# Patient Record
Sex: MALE | Race: BLACK OR AFRICAN AMERICAN | NOT HISPANIC OR LATINO | Employment: UNEMPLOYED | URBAN - METROPOLITAN AREA
[De-identification: names, ages, dates, MRNs, and addresses within clinical notes are randomized per-mention and may not be internally consistent; named-entity substitution may affect disease eponyms.]

---

## 2020-08-13 LAB — CRC RECOMMENDATION EXT: NORMAL

## 2024-05-03 ENCOUNTER — LAB VISIT (OUTPATIENT)
Dept: LAB | Facility: CLINIC | Age: 64
End: 2024-05-03
Payer: MEDICARE

## 2024-05-03 ENCOUNTER — OFFICE VISIT (OUTPATIENT)
Dept: FAMILY MEDICINE | Facility: CLINIC | Age: 64
End: 2024-05-03
Payer: MEDICARE

## 2024-05-03 VITALS
OXYGEN SATURATION: 95 % | DIASTOLIC BLOOD PRESSURE: 70 MMHG | HEART RATE: 66 BPM | HEIGHT: 64 IN | RESPIRATION RATE: 18 BRPM | WEIGHT: 154 LBS | BODY MASS INDEX: 26.29 KG/M2 | SYSTOLIC BLOOD PRESSURE: 120 MMHG

## 2024-05-03 DIAGNOSIS — E78.2 MIXED HYPERLIPIDEMIA: ICD-10-CM

## 2024-05-03 DIAGNOSIS — I48.11 LONGSTANDING PERSISTENT ATRIAL FIBRILLATION: ICD-10-CM

## 2024-05-03 DIAGNOSIS — R79.9 ABNORMAL FINDING OF BLOOD CHEMISTRY, UNSPECIFIED: ICD-10-CM

## 2024-05-03 DIAGNOSIS — I48.11 LONGSTANDING PERSISTENT ATRIAL FIBRILLATION: Primary | ICD-10-CM

## 2024-05-03 DIAGNOSIS — I10 PRIMARY HYPERTENSION: ICD-10-CM

## 2024-05-03 PROBLEM — I48.91 ATRIAL FIBRILLATION: Status: ACTIVE | Noted: 2024-05-03

## 2024-05-03 LAB
ALBUMIN SERPL BCP-MCNC: 4 G/DL (ref 3.5–5.2)
ALP SERPL-CCNC: 81 U/L (ref 55–135)
ALT SERPL W/O P-5'-P-CCNC: 36 U/L (ref 10–44)
ANION GAP SERPL CALC-SCNC: 7 MMOL/L (ref 8–16)
AST SERPL-CCNC: 23 U/L (ref 10–40)
BASOPHILS # BLD AUTO: 0.03 K/UL (ref 0–0.2)
BASOPHILS NFR BLD: 0.4 % (ref 0–1.9)
BILIRUB SERPL-MCNC: 0.4 MG/DL (ref 0.1–1)
BUN SERPL-MCNC: 18 MG/DL (ref 8–23)
CALCIUM SERPL-MCNC: 9.8 MG/DL (ref 8.7–10.5)
CHLORIDE SERPL-SCNC: 105 MMOL/L (ref 95–110)
CHOLEST SERPL-MCNC: 157 MG/DL (ref 120–199)
CHOLEST/HDLC SERPL: 3.3 {RATIO} (ref 2–5)
CO2 SERPL-SCNC: 26 MMOL/L (ref 23–29)
CREAT SERPL-MCNC: 1 MG/DL (ref 0.5–1.4)
DIFFERENTIAL METHOD BLD: ABNORMAL
EOSINOPHIL # BLD AUTO: 0.4 K/UL (ref 0–0.5)
EOSINOPHIL NFR BLD: 4.8 % (ref 0–8)
ERYTHROCYTE [DISTWIDTH] IN BLOOD BY AUTOMATED COUNT: 12.8 % (ref 11.5–14.5)
EST. GFR  (NO RACE VARIABLE): >60 ML/MIN/1.73 M^2
ESTIMATED AVG GLUCOSE: 120 MG/DL (ref 68–131)
GLUCOSE SERPL-MCNC: 105 MG/DL (ref 70–110)
HBA1C MFR BLD: 5.8 % (ref 4–5.6)
HCT VFR BLD AUTO: 43 % (ref 40–54)
HCV AB SERPL QL IA: NORMAL
HDLC SERPL-MCNC: 47 MG/DL (ref 40–75)
HDLC SERPL: 29.9 % (ref 20–50)
HGB BLD-MCNC: 14.4 G/DL (ref 14–18)
IMM GRANULOCYTES # BLD AUTO: 0.02 K/UL (ref 0–0.04)
IMM GRANULOCYTES NFR BLD AUTO: 0.3 % (ref 0–0.5)
LDLC SERPL CALC-MCNC: 91 MG/DL (ref 63–159)
LYMPHOCYTES # BLD AUTO: 1.9 K/UL (ref 1–4.8)
LYMPHOCYTES NFR BLD: 25.2 % (ref 18–48)
MCH RBC QN AUTO: 30.3 PG (ref 27–31)
MCHC RBC AUTO-ENTMCNC: 33.5 G/DL (ref 32–36)
MCV RBC AUTO: 91 FL (ref 82–98)
MONOCYTES # BLD AUTO: 0.7 K/UL (ref 0.3–1)
MONOCYTES NFR BLD: 9.5 % (ref 4–15)
NEUTROPHILS # BLD AUTO: 4.5 K/UL (ref 1.8–7.7)
NEUTROPHILS NFR BLD: 59.8 % (ref 38–73)
NONHDLC SERPL-MCNC: 110 MG/DL
NRBC BLD-RTO: 0 /100 WBC
PLATELET # BLD AUTO: 330 K/UL (ref 150–450)
PMV BLD AUTO: 9 FL (ref 9.2–12.9)
POTASSIUM SERPL-SCNC: 4.4 MMOL/L (ref 3.5–5.1)
PROT SERPL-MCNC: 7.4 G/DL (ref 6–8.4)
RBC # BLD AUTO: 4.75 M/UL (ref 4.6–6.2)
SODIUM SERPL-SCNC: 138 MMOL/L (ref 136–145)
TRIGL SERPL-MCNC: 95 MG/DL (ref 30–150)
TSH SERPL DL<=0.005 MIU/L-ACNC: 1.32 UIU/ML (ref 0.4–4)
WBC # BLD AUTO: 7.57 K/UL (ref 3.9–12.7)

## 2024-05-03 PROCEDURE — 84443 ASSAY THYROID STIM HORMONE: CPT | Performed by: FAMILY MEDICINE

## 2024-05-03 PROCEDURE — 86803 HEPATITIS C AB TEST: CPT | Performed by: FAMILY MEDICINE

## 2024-05-03 PROCEDURE — 85025 COMPLETE CBC W/AUTO DIFF WBC: CPT | Performed by: FAMILY MEDICINE

## 2024-05-03 PROCEDURE — 80061 LIPID PANEL: CPT | Performed by: FAMILY MEDICINE

## 2024-05-03 PROCEDURE — 99204 OFFICE O/P NEW MOD 45 MIN: CPT | Mod: S$GLB,,, | Performed by: FAMILY MEDICINE

## 2024-05-03 PROCEDURE — 36415 COLL VENOUS BLD VENIPUNCTURE: CPT | Mod: ,,, | Performed by: FAMILY MEDICINE

## 2024-05-03 PROCEDURE — 87389 HIV-1 AG W/HIV-1&-2 AB AG IA: CPT | Performed by: FAMILY MEDICINE

## 2024-05-03 PROCEDURE — G2211 COMPLEX E/M VISIT ADD ON: HCPCS | Mod: S$GLB,,, | Performed by: FAMILY MEDICINE

## 2024-05-03 PROCEDURE — 83036 HEMOGLOBIN GLYCOSYLATED A1C: CPT | Performed by: FAMILY MEDICINE

## 2024-05-03 PROCEDURE — 80053 COMPREHEN METABOLIC PANEL: CPT | Performed by: FAMILY MEDICINE

## 2024-05-03 RX ORDER — ROSUVASTATIN CALCIUM 10 MG/1
10 TABLET, COATED ORAL DAILY
COMMUNITY
Start: 2010-04-26

## 2024-05-03 RX ORDER — AMLODIPINE BESYLATE 5 MG/1
5 TABLET ORAL DAILY
COMMUNITY

## 2024-05-03 RX ORDER — LISINOPRIL AND HYDROCHLOROTHIAZIDE 20; 25 MG/1; MG/1
1 TABLET ORAL DAILY
COMMUNITY

## 2024-05-03 RX ORDER — PROPAFENONE HYDROCHLORIDE 225 MG/1
225 CAPSULE, EXTENDED RELEASE ORAL EVERY 12 HOURS
COMMUNITY
Start: 2022-10-01

## 2024-05-03 RX ORDER — APIXABAN 5 MG/1
5 TABLET, FILM COATED ORAL 2 TIMES DAILY
COMMUNITY
Start: 2022-03-03

## 2024-05-03 NOTE — PATIENT INSTRUCTIONS
Doing very well today  Get blood work today  Will send to cardiologist    Can start nail medication if labs look good    Follow up 6 months

## 2024-05-03 NOTE — PROGRESS NOTES
"    [facility] - [place], [place]    Family Medicine Office Visit    Chief Complaint   Patient presents with    [facility] Care     New patient         HPI:  63 male new patient.    Afib stable s/p ablation.  Now on propafenone and eliquis  Blood Pressure at goal on medication, with patient reporting prescription compliance  Hyperlipidemia stable on appropriate intensity statin therapy    Needs no refills today      ROS: as above    Vitals:    05/03/24 0916   BP: 120/70   BP Location: Left arm   Patient Position: Sitting   BP Method: Medium (Manual)   Pulse: 66   Resp: 18   SpO2: 95%   Weight: 69.9 kg (154 lb)   Height: 5' 4" (1.626 m)      Body mass index is 26.43 kg/m².      General:  AOx3, well nourished and developed in no acute distress  Eyes:  PERRLA, EOMI, vision intact grossly  ENT:  normal hearing, moist oral mucosa  Neck:  trachea midline with no masses or thyromegaly  Heart:  RRR, no murmurs.  No edema noted, extremities warm and well perfused  Lungs:  clear to auscultation bilaterally with symmetric chest movement  Abdomen:  Soft, nontender, nondistended.  Normal bowel sounds  Musculoskeletal:  Normal gait.  Normal posture.  Normal muscular development with no joint swelling.  Neurological:  CN II-XII grossly intact. Symmetric strength and sensation  Psych:  Normal mood and affect.  Able to demonstrate good judgement and personal insight.      Assessment/Plan:    1. Longstanding persistent atrial fibrillation  -     TSH; Future; Expected date: 05/03/2024    2. Primary hypertension  -     Hepatitis C antibody; Future; Expected date: 05/03/2024  -     Lipid panel; Future; Expected date: 05/03/2024  -     HIV 1/2 Ag/Ab (4th Gen); Future; Expected date: 05/03/2024  -     CBC Auto Differential; Future; Expected date: 05/03/2024  -     Comprehensive Metabolic Panel; Future; Expected date: 05/03/2024  -     Hemoglobin A1C; Future; Expected date: 05/03/2024  -     TSH; Future; " Expected date: 05/03/2024    3. Mixed hyperlipidemia  -     Hepatitis C antibody; Future; Expected date: 05/03/2024  -     Lipid panel; Future; Expected date: 05/03/2024  -     HIV 1/2 Ag/Ab (4th Gen); Future; Expected date: 05/03/2024  -     CBC Auto Differential; Future; Expected date: 05/03/2024  -     Comprehensive Metabolic Panel; Future; Expected date: 05/03/2024  -     Hemoglobin A1C; Future; Expected date: 05/03/2024  -     TSH; Future; Expected date: 05/03/2024    4. Abnormal finding of blood chemistry, unspecified  -     Hemoglobin A1C; Future; Expected date: 05/03/2024         Stable, place referral for cardiology today  At goal  Stable, get routine labs today  Stable  Visit today included increased complexity associated with the care of the episodic problem htn, hld, afib addressed and managing the longitudinal care of the patient due to the serious and/or complex managed problem(s) htn, hld, afib.

## 2024-05-04 LAB — HIV 1+2 AB+HIV1 P24 AG SERPL QL IA: NORMAL

## 2024-05-06 ENCOUNTER — PATIENT MESSAGE (OUTPATIENT)
Dept: FAMILY MEDICINE | Facility: CLINIC | Age: 64
End: 2024-05-06
Payer: MEDICARE

## 2024-05-06 PROBLEM — R73.01 IMPAIRED FASTING GLUCOSE: Status: ACTIVE | Noted: 2024-05-06

## 2024-05-07 RX ORDER — TERBINAFINE HYDROCHLORIDE 250 MG/1
250 TABLET ORAL DAILY
Qty: 90 TABLET | Refills: 0 | Status: SHIPPED | OUTPATIENT
Start: 2024-05-07

## 2024-05-15 ENCOUNTER — PATIENT OUTREACH (OUTPATIENT)
Dept: ADMINISTRATIVE | Facility: HOSPITAL | Age: 64
End: 2024-05-15
Payer: MEDICARE

## 2024-05-15 NOTE — LETTER
AUTHORIZATION FOR RELEASE OF   CONFIDENTIAL INFORMATION    Dear [employer],    We are seeing Jona Levine, date of birth 1960, in the clinic at [facility]. Domingo Motley MD is the patient's PCP. Jona Levine has an outstanding lab/procedure at the time we reviewed his chart. In order to help keep his health information updated, he has authorized us to request the following medical record(s):        (  )  MAMMOGRAM                                      ( X )  COLONOSCOPY      (  )  PAP SMEAR                                          (  )  OUTSIDE LAB RESULTS     (  )  DEXA SCAN                                          (  )  EYE EXAM            (  )  FOOT EXAM                                          (  )  ENTIRE RECORD     (  )  OUTSIDE IMMUNIZATIONS                 (  )  _______________         Please fax records to [facility], Hulin, Michael P., MD at [phone]    Thanks so much and have a great day!    Jami Hardy LPN [facility]  [address] [place] [address]  P- [phone]  F- [phone]           Patient Name: Jona Levine  : 1960  Patient Phone #: [phone]

## 2024-05-15 NOTE — PROGRESS NOTES
Population Health Chart Review & Patient Outreach Details      Additional [facility] Health Notes:               Updates Requested / Reviewed:      Updated Care Coordination Note and Care Everywhere         Health Maintenance Topics Overdue:      VBHM Score: 2     Colon Cancer Screening  AAA Screening    Pneumonia Vaccine  Tetanus Vaccine  Shingles/Zoster Vaccine  RSV Vaccine                  Health Maintenance Topic(s) Outreach Outcomes & Actions Taken:    Colorectal Cancer Screening - Outreach Outcomes & Actions Taken  : External Records Requested & Care Team Updated if Applicable

## 2024-05-17 ENCOUNTER — PATIENT OUTREACH (OUTPATIENT)
Dept: ADMINISTRATIVE | Facility: HOSPITAL | Age: 64
End: 2024-05-17
Payer: MEDICARE

## 2024-05-17 NOTE — PROGRESS NOTES
Population Health Chart Review & Patient Outreach Details      Additional [facility] Health Notes:               Updates Requested / Reviewed:      Updated Care Coordination Note and          Health Maintenance Topics Overdue:      VBHM Score: 2     Colon Cancer Screening  AAA Screening    Pneumonia Vaccine  Tetanus Vaccine  Shingles/Zoster Vaccine  RSV Vaccine                  Health Maintenance Topic(s) Outreach Outcomes & Actions Taken:    Colorectal Cancer Screening - Outreach Outcomes & Actions Taken  : External Records Uploaded, Care Team Updated, & History Updated if Applicable

## 2024-07-11 ENCOUNTER — OFFICE VISIT (OUTPATIENT)
Dept: CARDIOLOGY | Facility: CLINIC | Age: 64
End: 2024-07-11
Payer: MEDICARE

## 2024-07-11 ENCOUNTER — LAB VISIT (OUTPATIENT)
Dept: LAB | Facility: HOSPITAL | Age: 64
End: 2024-07-11
Attending: INTERNAL MEDICINE
Payer: MEDICARE

## 2024-07-11 VITALS
DIASTOLIC BLOOD PRESSURE: 85 MMHG | OXYGEN SATURATION: 96 % | HEART RATE: 86 BPM | WEIGHT: 155.13 LBS | BODY MASS INDEX: 26.49 KG/M2 | SYSTOLIC BLOOD PRESSURE: 150 MMHG | HEIGHT: 64 IN

## 2024-07-11 DIAGNOSIS — Z86.79 STATUS POST ABLATION OF ATRIAL FIBRILLATION: ICD-10-CM

## 2024-07-11 DIAGNOSIS — Z82.0 FAMILY HISTORY OF SLEEP APNEA: ICD-10-CM

## 2024-07-11 DIAGNOSIS — Z87.891 HISTORY OF SMOKING GREATER THAN 50 PACK YEARS: ICD-10-CM

## 2024-07-11 DIAGNOSIS — Z98.890 STATUS POST ABLATION OF ATRIAL FIBRILLATION: ICD-10-CM

## 2024-07-11 DIAGNOSIS — E78.2 MIXED HYPERLIPIDEMIA: ICD-10-CM

## 2024-07-11 DIAGNOSIS — Z79.899 LONG TERM CURRENT USE OF ANTIARRHYTHMIC DRUG: ICD-10-CM

## 2024-07-11 DIAGNOSIS — I48.11 LONGSTANDING PERSISTENT ATRIAL FIBRILLATION: ICD-10-CM

## 2024-07-11 DIAGNOSIS — Z91.89 AT HIGH RISK FOR CARDIOVASCULAR DISEASE: ICD-10-CM

## 2024-07-11 DIAGNOSIS — R73.01 IMPAIRED FASTING GLUCOSE: ICD-10-CM

## 2024-07-11 DIAGNOSIS — I48.0 PAROXYSMAL ATRIAL FIBRILLATION: Primary | ICD-10-CM

## 2024-07-11 DIAGNOSIS — I10 PRIMARY HYPERTENSION: ICD-10-CM

## 2024-07-11 DIAGNOSIS — Z79.01 ANTICOAGULANT LONG-TERM USE: ICD-10-CM

## 2024-07-11 DIAGNOSIS — Z82.49 FAMILY HISTORY OF PREMATURE CAD: ICD-10-CM

## 2024-07-11 LAB
CHOLEST SERPL-MCNC: 139 MG/DL (ref 120–199)
CHOLEST/HDLC SERPL: 2.9 {RATIO} (ref 2–5)
HDLC SERPL-MCNC: 48 MG/DL (ref 40–75)
HDLC SERPL: 34.5 % (ref 20–50)
LDLC SERPL CALC-MCNC: 76.8 MG/DL (ref 63–159)
NONHDLC SERPL-MCNC: 91 MG/DL
TRIGL SERPL-MCNC: 71 MG/DL (ref 30–150)

## 2024-07-11 PROCEDURE — 99205 OFFICE O/P NEW HI 60 MIN: CPT | Mod: S$PBB,,, | Performed by: INTERNAL MEDICINE

## 2024-07-11 PROCEDURE — 99999 PR PBB SHADOW E&M-EST. PATIENT-LVL IV: CPT | Mod: PBBFAC,,, | Performed by: INTERNAL MEDICINE

## 2024-07-11 PROCEDURE — 36415 COLL VENOUS BLD VENIPUNCTURE: CPT | Performed by: INTERNAL MEDICINE

## 2024-07-11 PROCEDURE — 80061 LIPID PANEL: CPT | Performed by: INTERNAL MEDICINE

## 2024-07-11 PROCEDURE — 99214 OFFICE O/P EST MOD 30 MIN: CPT | Mod: PBBFAC,PN | Performed by: INTERNAL MEDICINE

## 2024-07-11 RX ORDER — ROSUVASTATIN CALCIUM 20 MG/1
20 TABLET, COATED ORAL DAILY
Qty: 90 TABLET | Refills: 3 | Status: SHIPPED | OUTPATIENT
Start: 2024-07-11

## 2024-07-11 NOTE — PATIENT INSTRUCTIONS
Recommended Mediterranean dietEating Heart-Healthy Food: Using the DASH Plan  Eating for your heart doesnt have to be hard or boring. You just need to know how to make healthier choices. The DASH eating plan has been developed to help you do just that. DASH stands for Dietary Approaches to Stop Hypertension. It is a plan that has been proven to be healthier for your heart and to lower your risk for high blood pressure. It can also help lower your risk for cancer, heart disease, osteoporosis, and diabetes.  Choosing from Each Food Group  Choose foods from each of the food groups below each day. Try to get the recommended number of servings for each food group. The serving numbers are based on a diet of 2,000 calories a day. Talk to your doctor if youre unsure about your calorie needs.  Grains   Servings: 7-8 a day  A serving is:  1 slice bread  1 ounce dry cereal  half a cup cooked rice or pasta  Best choices: Whole grains and any grains high in fiber.  Vegetables   Servings: 4-5 a day  A serving is:  1 cup raw leafy vegetable  Half a cup cooked vegetable  Three-quarter cup vegetable juice  Best choices: Fresh or frozen vegetable prepared without too much added salt or fat.    Fruits   Servings: 4-5 a day  A serving is:  Three-quarter cup fruit juice  1 medium fruit  One-quarter cup dried fruit  One-half cup fresh, frozen, or canned fruit  Best choices: A variety of fresh fruits of different colors. Whole fruits are a much better choice than fruit juices.  Low-fat or Fat Free Dairy   Servings: 2-3 a day  A serving is:  8 ounces milk  1 cup yogurt  One and a half ounces cheese  Best choices: Skim or 1% milk, low-fat or fat free yogurt or buttermilk, and low-fat cheeses.       Meat, Poultry, Fish   Servings: 2 or fewer a day  A serving is:  3 ounces cooked meat, poultry, or fish  Best choices: Lean meats and fish. Trim away visible fat. Broil, roast, or boil instead of frying. Remove skin from poultry before eating.   Nuts, Seeds, Beans   Servings: 4-5 a week  A serving is:  One third cup nuts (or one and a half ounces)  2 tablespoons sunflower seeds  Half a cup cooked beans  Best choices: Dry roasted nuts with no salt added, lentils, kidney beans, garbanzo beans, and whole patel beans.    Fats and Oils   Servings: 2 a day  A serving is:  1 teaspoon vegetable oil  1 teaspoon soft margarine  1 tablespoon low-fat mayonnaise  1 teaspoon regular mayonnaise  2 tablespoons light salad dressing  1 tablespoon regular salad dressing  Best choices: Monounsaturated and polyunsaturated fats such as olive, canola, or safflower oil.  Sweets   Servings: 5 a week or fewer  A serving is:  1 tablespoon sugar, maple syrup, or honey  1 tablespoon jam or jelly  1 half-ounce jelly beans (about 15)  8 ounces lemonade  Best choices: Dried fruit can be a satisfying sweet. Choose low-fat sweets when possible. And watch your serving sizes!       Aerobic Exercise for a Healthy Heart  Exercise is a lot more than an energy booster and a stress reliever. It also strengthens your heart muscle, lowers your blood pressure and blood cholesterol, and burns calories.      Remember, some activity is better than none.     Choose an Aerobic Activity  Choose a nonstop activity that makes your heart and lungs work harder than they do when you rest or walk normally. This aerobic exercise can improve the way your heart and other muscles use oxygen. Make it fun by exercising with a friend and choosing an activity you enjoy. Here are some ideas:  Walking  Swimming  Bicycling  Stair climbing  Dancing  Jogging  Exercise Regularly  If you havent been exercising regularly,  get your doctors okay first. Then start slowly.  Here are some tips:  Begin exercising 3 times a week for 5-10 minutes at a time.  When you feel comfortable, add a few minutes each week.  Slowly build up to exercising 3-4 times each week for 20-40 minutes. Aim for a total of 150 or more minutes a  week.  Be sure to carry your nitroglycerin with you when you exercise.  If you get angina when youre exercising, stop what youre doing, take your nitroglycerin, and call your doctor.  © 6652-4481 [employer], [address] [place] [address], [place] [address]. All rights reserved. This information is not intended as a substitute for professional medical care. Always follow your healthcare professional's instructions.

## 2024-07-11 NOTE — PROGRESS NOTES
Subjective:        Patient ID:  Jona Levine is a 63 y.o. 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    Patient is a new patient to me.     Health literacy: high  Vaccinations: up-to-date, completed COVID, no infection  Activities:  gym daily for 60-90 minutes and then walk for 30 minutes, no problem, not limited.  Nicotine: quit 5/2024, 50+ py  Alcohol: no  Illicit drugs: No  Cardiac symptoms:  none  Home BP: No, do not check  Medication compliance: Yes, do not miss  Diet: Regular, use minimal salt  Caffeine: Coffee 1 cpd  Labs:   Sodium   Date Value Ref Range Status   05/03/2024 138 136 - 145 mmol/L Final     Potassium   Date Value Ref Range Status   05/03/2024 4.4 3.5 - 5.1 mmol/L Final     Chloride   Date Value Ref Range Status   05/03/2024 105 95 - 110 mmol/L Final     CO2   Date Value Ref Range Status   05/03/2024 26 23 - 29 mmol/L Final     Glucose   Date Value Ref Range Status   05/03/2024 105 70 - 110 mg/dL Final     BUN   Date Value Ref Range Status   05/03/2024 18 8 - 23 mg/dL Final     Creatinine   Date Value Ref Range Status   05/03/2024 1.0 0.5 - 1.4 mg/dL Final     Calcium   Date Value Ref Range Status   05/03/2024 9.8 8.7 - 10.5 mg/dL Final     Total Protein   Date Value Ref Range Status   05/03/2024 7.4 6.0 - 8.4 g/dL Final     Albumin   Date Value Ref Range Status   05/03/2024 4.0 3.5 - 5.2 g/dL Final     Total Bilirubin   Date Value Ref Range Status   05/03/2024 0.4 0.1 - 1.0 mg/dL Final     Comment:     For infants and newborns, interpretation of results should be based  on gestational age, weight and in agreement with clinical  observations.    Premature Infant recommended reference ranges:  Up to 24 hours.............<8.0 mg/dL  Up to 48 hours............<12.0 mg/dL  3-5  days..................<15.0 mg/dL  6-29 days.................<15.0 mg/dL       Alkaline Phosphatase   Date Value Ref Range Status   05/03/2024 81 55 - 135 U/L Final     AST   Date Value Ref Range Status   05/03/2024 23 10 - 40 U/L Final     ALT   Date Value Ref Range Status   05/03/2024 36 10 - 44 U/L Final     Anion Gap   Date Value Ref Range Status   05/03/2024 7 (L) 8 - 16 mmol/L Final     eGFR   Date Value Ref Range Status   05/03/2024 >60.0 >60 mL/min/1.73 m^2 Final      Lab Results   Component Value Date    WBC 7.57 05/03/2024    RBC 4.75 05/03/2024    HGB 14.4 05/03/2024    HCT 43.0 05/03/2024    MCV 91 05/03/2024    MCH 30.3 05/03/2024    MCHC 33.5 05/03/2024    RDW 12.8 05/03/2024     05/03/2024    MPV 9.0 (L) 05/03/2024    GRAN 4.5 05/03/2024    GRAN 59.8 05/03/2024    LYMPH 1.9 05/03/2024    LYMPH 25.2 05/03/2024    MONO 0.7 05/03/2024    MONO 9.5 05/03/2024    EOS 0.4 05/03/2024    BASO 0.03 05/03/2024    EOSINOPHIL 4.8 05/03/2024    BASOPHIL 0.4 05/03/2024      Lab Results   Component Value Date    TSH 1.321 05/03/2024     Lab Results   Component Value Date    HGBA1C 5.8 (H) 05/03/2024      Lab Results   Component Value Date    CHOL 157 05/03/2024     Lab Results   Component Value Date    HDL 47 05/03/2024     Lab Results   Component Value Date    LDLCALC 91.0 05/03/2024     Lab Results   Component Value Date    TRIG 95 05/03/2024       Lab Results   Component Value Date    CHOLHDL 29.9 05/03/2024      Last Echo: 2020  No results found for this or any previous visit.    Last stress test: 2020  No results found for this or any previous visit.     Cardiovascular angiogram: none      ECG: NSR, rate 89, RBBB, QTc 476 msec    Fundoscopic exam: due  Mixed [place] and [place] male referred for CV evaluation. Developed AF in 2022 pre-endoscopy and received ablation later 2022. Noted more GOODRICH. Improved after ablation. Have high ASCVD 10-event event risk due to heavy smoking, quit since 5/2024 and  "HTN. Denies any CP nor SOB. May have premature family history with mother having MI in her early 60s.       Domingo Motley MD noted 5/3/2024 "Afib stable s/p ablation.  Now on propafenone and eliquis  Blood Pressure at goal on medication, with patient reporting prescription compliance  Hyperlipidemia stable on appropriate intensity statin therapy"      Review of Systems   Constitutional: Negative for diaphoresis, fever, malaise/fatigue, night sweats and weight gain.   HENT:  Negative for hearing loss, nosebleeds and tinnitus.    Eyes:  Negative for discharge and visual disturbance.   Cardiovascular:  Negative for chest pain, claudication, cyanosis, dyspnea on exertion, irregular heartbeat, leg swelling, near-syncope, orthopnea, palpitations and paroxysmal nocturnal dyspnea.   Respiratory:  Negative for cough, shortness of breath, sleep disturbances due to breathing, snoring and wheezing.         [place] score 5, awaken tired and have to go back to sleep due to the dog. Father have LAWRENCE.   Endocrine: Negative for polydipsia and polyuria.   Hematologic/Lymphatic: Does not bruise/bleed easily.   Skin:  Positive for itching. Negative for color change, flushing, nail changes, poor wound healing and suspicious lesions.   Musculoskeletal:  Negative for arthritis, falls, gout, joint pain, joint swelling, muscle cramps, muscle weakness, myalgias and neck pain.   Gastrointestinal:  Negative for constipation, diarrhea, heartburn, hematemesis, hematochezia, melena, nausea and vomiting.   Genitourinary:  Negative for hematuria and urgency.   Neurological:  Negative for disturbances in coordination, excessive daytime sleepiness, dizziness, focal weakness, headaches, light-headedness, loss of balance, numbness, vertigo and weakness.   Psychiatric/Behavioral:  Negative for depression and substance abuse. The patient does not have insomnia and is not nervous/anxious.      Answers submitted by the patient for this visit:  Review of " "Systems Questionnaire (Submitted on 7/10/2024)  activity change: No  unexpected weight change: No  rhinorrhea: No  trouble swallowing: No  chest tightness: No  blood in stool: No  difficulty urinating: No  arthralgias: No  confusion: No  dysphoric mood: No     Objective:    Physical Exam  Constitutional:       Appearance: He is well-developed.      Comments: RA O2 sat 96%  Orthostatic VS: sitting 141/78, standing 150/85   HENT:      Head: Normocephalic.   Eyes:      Conjunctiva/sclera: Conjunctivae normal.      Pupils: Pupils are equal, round, and reactive to light.   Neck:      Thyroid: No thyromegaly.      Vascular: No JVD.   Cardiovascular:      Rate and Rhythm: Normal rate and regular rhythm.      Pulses: Intact distal pulses.           Carotid pulses are 1+ on the right side and 1+ on the left side.       Radial pulses are 1+ on the right side and 1+ on the left side.        Dorsalis pedis pulses are 1+ on the right side and 1+ on the left side.        Posterior tibial pulses are 1+ on the right side and 1+ on the left side.      Heart sounds: Normal heart sounds. No murmur heard.     No friction rub. No gallop.   Pulmonary:      Effort: Pulmonary effort is normal.      Breath sounds: No rales.      Comments: Diminished breath sounds and prolong expiration.  Chest:      Chest wall: No tenderness.   Abdominal:      General: Bowel sounds are normal.      Palpations: Abdomen is soft.      Tenderness: There is no abdominal tenderness.      Comments: Waist 38"   Musculoskeletal:         General: Normal range of motion.      Cervical back: Normal range of motion and neck supple.   Lymphadenopathy:      Cervical: No cervical adenopathy.   Skin:     General: Skin is warm and dry.      Findings: No rash.   Neurological:      Mental Status: He is alert and oriented to person, place, and time.           Assessment:       1. Paroxysmal atrial fibrillation    2. Longstanding persistent atrial fibrillation    3. Status post " ablation of atrial fibrillation    4. Anticoagulant long-term use    5. Long term current use of antiarrhythmic drug, propafenone (RYTHMOL SR)    6. Primary hypertension, dx 2000    7. Mixed hyperlipidemia    8. Impaired fasting glucose    9. History of smoking greater than 50 pack years, started age 11, 48 years, 1 ppd, quit 5/2024    10. At high risk for cardiovascular disease, ASCVD 10-year event risk 20.8% on Crestor 10 mg, 2024    11. Family history of premature CAD    12. Family history of sleep apnea         Plan:       Jona was seen today for establish care.    Diagnoses and all orders for this visit:    Paroxysmal atrial fibrillation  -     IN OFFICE EKG 12-LEAD (to Muse)  -     Echo; Future    Longstanding persistent atrial fibrillation  -     Ambulatory referral/consult to Cardiology    Status post ablation of atrial fibrillation    Anticoagulant long-term use    Long term current use of antiarrhythmic drug, propafenone (RYTHMOL SR)    Primary hypertension, dx 2000  -     Microalbumin/Creatinine Ratio, Urine; Future  -     Echo; Future    Mixed hyperlipidemia  -     rosuvastatin (CRESTOR) 20 MG tablet; Take 1 tablet (20 mg total) by mouth once daily.  -     Lipid Panel; Future    Impaired fasting glucose    History of smoking greater than 50 pack years, started age 11, 48 years, 1 ppd, quit 5/2024    At high risk for cardiovascular disease, ASCVD 10-year event risk 20.8% on Crestor 10 mg, 2024  -     rosuvastatin (CRESTOR) 20 MG tablet; Take 1 tablet (20 mg total) by mouth once daily.  -     Lipid Panel; Future    Family history of premature CAD    Family history of sleep apnea     - All medical issues reviewed, continue current Rx. Patient prefer to continue Propafenone (RYTHMOL)  - Warning signs of MI and stroke given, if symptoms last more than 5 minutes, stop immediately and call 911.   - Need annual fundoscopic examination   - CV status and all medications reviewed, patient acknowledge good  understanding.  - Recommend healthy living: no nicotine, avoid alcohol, healthy diet and regular exercise aiming for fitness, restorative sleep and weight control  - Need good exercise program, 4 key elements: 1. Aerobic (walking, swimming, dancing, jogging, biking, etc, 2. Muscle strengthening / resistance exercise, need to do 2-3 times weekly, 3. Stretching daily, good stretch takes a whole  total minute. 4. Balance exercise daily.   - Instruction for Mediterranean diet and heart healthy exercise given.  - Check home blood pressure, 2 days weekly, do 2 readings within 5 minutes in AM and PM, keep log for review. Target resting BP is less than 130/80.   - Highly recommend 30-60 minutes of exercise / activities daily, can have Sunday off, with 2-3 sessions of muscle strengthening weekly. A  would be very helpful.  - Recommend at least annual cardiovascular evaluation in view of patient's significant risk factors.  - Phone review / encourage use of [facility]       Total time spend including review of record prior to face-to-face visit is 60 minutes. Greater than 50% of the time was spent in counseling and coordination of care. The above assessment and plan have been discussed at length. Referring provider's note reviewed. Labs and procedure over the last 6 months reviewed. Problem List updated. Asked to bring in all active medications / pills bottles with next visit. Will send note to referring / PCP.

## 2024-07-12 ENCOUNTER — PATIENT MESSAGE (OUTPATIENT)
Dept: CARDIOLOGY | Facility: CLINIC | Age: 64
End: 2024-07-12
Payer: MEDICARE

## 2024-07-16 ENCOUNTER — TELEPHONE (OUTPATIENT)
Dept: CARDIOLOGY | Facility: CLINIC | Age: 64
End: 2024-07-16
Payer: MEDICARE

## 2024-07-16 ENCOUNTER — PATIENT MESSAGE (OUTPATIENT)
Dept: CARDIOLOGY | Facility: CLINIC | Age: 64
End: 2024-07-16
Payer: MEDICARE

## 2024-07-16 DIAGNOSIS — I10 PRIMARY HYPERTENSION: ICD-10-CM

## 2024-07-16 NOTE — TELEPHONE ENCOUNTER
Patient is asking about results of urinalysis but I don't see where one was ordered or completed. Please advise

## 2024-07-23 ENCOUNTER — HOSPITAL ENCOUNTER (OUTPATIENT)
Dept: CARDIOLOGY | Facility: HOSPITAL | Age: 64
Discharge: HOME | End: 2024-07-23
Attending: INTERNAL MEDICINE
Payer: MEDICARE

## 2024-07-23 DIAGNOSIS — I48.0 PAROXYSMAL ATRIAL FIBRILLATION: ICD-10-CM

## 2024-07-23 DIAGNOSIS — I10 PRIMARY HYPERTENSION: ICD-10-CM

## 2024-07-23 LAB
AORTIC ROOT ANNULUS: 2.8 CM
AORTIC VALVE CUSP SEPERATION: 0.73 CM
AV INDEX (PROSTH): 0.61
AV MEAN GRADIENT: 8 MMHG
AV PEAK GRADIENT: 14 MMHG
AV REGURGITATION PRESSURE HALF TIME: 666.19 MS
AV VALVE AREA BY VELOCITY RATIO: 1.65 CM²
AV VALVE AREA: 1.81 CM²
AV VELOCITY RATIO: 0.55
CV ECHO LV RWT: 0.45 CM
DOP CALC AO PEAK VEL: 1.9 M/S
DOP CALC AO VTI: 42.2 CM
DOP CALC LVOT AREA: 3 CM2
DOP CALC LVOT DIAMETER: 1.95 CM
DOP CALC LVOT PEAK VEL: 1.05 M/S
DOP CALC LVOT STROKE VOLUME: 76.42 CM3
DOP CALC MV VTI: 29.9 CM
DOP CALCLVOT PEAK VEL VTI: 25.6 CM
E WAVE DECELERATION TIME: 218.52 MSEC
E/A RATIO: 0.98
E/E' RATIO: 13.57 M/S
ECHO LV POSTERIOR WALL: 0.94 CM (ref 0.6–1.1)
FRACTIONAL SHORTENING: 32 % (ref 28–44)
GLOBAL LONGITUIDAL STRAIN: 16.2 %
INTERVENTRICULAR SEPTUM: 0.96 CM (ref 0.6–1.1)
IVC DIAMETER: 1.67 CM
LA MAJOR: 4.28 CM
LA MINOR: 2.51 CM
LEFT ATRIUM AREA SYSTOLIC (APICAL 2 CHAMBER): 14.09 CM2
LEFT ATRIUM AREA SYSTOLIC (APICAL 4 CHAMBER): 11.81 CM2
LEFT ATRIUM SIZE: 3.96 CM
LEFT ATRIUM VOLUME MOD: 31.16 CM3
LEFT INTERNAL DIMENSION IN SYSTOLE: 2.88 CM (ref 2.1–4)
LEFT VENTRICLE DIASTOLIC VOLUME: 79.05 ML
LEFT VENTRICLE END SYSTOLIC VOLUME APICAL 2 CHAMBER: 28.85 ML
LEFT VENTRICLE END SYSTOLIC VOLUME APICAL 4 CHAMBER: 26.35 ML
LEFT VENTRICLE SYSTOLIC VOLUME: 31.72 ML
LEFT VENTRICULAR INTERNAL DIMENSION IN DIASTOLE: 4.21 CM (ref 3.5–6)
LEFT VENTRICULAR MASS: 128.3 G
LV LATERAL E/E' RATIO: 11.88 M/S
LV SEPTAL E/E' RATIO: 15.83 M/S
LVED V (TEICH): 79.05 ML
LVES V (TEICH): 31.72 ML
LVOT MG: 2.39 MMHG
LVOT MV: 0.72 CM/S
MV MEAN GRADIENT: 2 MMHG
MV PEAK A VEL: 0.97 M/S
MV PEAK E VEL: 0.95 M/S
MV PEAK GRADIENT: 6 MMHG
MV STENOSIS PRESSURE HALF TIME: 78.33 MS
MV VALVE AREA BY CONTINUITY EQUATION: 2.56 CM2
MV VALVE AREA P 1/2 METHOD: 2.81 CM2
PISA AR MAX VEL: 3.6 M/S
PISA TR MAX VEL: 2.17 M/S
PV MV: 0.58 M/S
PV PEAK GRADIENT: 3 MMHG
PV PEAK VELOCITY: 0.87 M/S
RA MAJOR: 3.64 CM
RA PRESSURE ESTIMATED: 3 MMHG
RA WIDTH: 3.15 CM
RIGHT VENTRICLE DIASTOLIC BASEL DIMENSION: 3 CM
RIGHT VENTRICLE DIASTOLIC LENGTH: 4.7 CM
RIGHT VENTRICLE DIASTOLIC MID DIMENSION: 2.2 CM
RIGHT VENTRICULAR LENGTH IN DIASTOLE (APICAL 4-CHAMBER VIEW): 4.74 CM
RV MID DIAMA: 2.18 CM
RV TB RVSP: 5 MMHG
TDI LATERAL: 0.08 M/S
TDI SEPTAL: 0.06 M/S
TDI: 0.07 M/S
TR MAX PG: 19 MMHG
TV REST PULMONARY ARTERY PRESSURE: 22 MMHG

## 2024-07-23 PROCEDURE — 93306 TTE W/DOPPLER COMPLETE: CPT

## 2024-07-23 PROCEDURE — 93356 MYOCRD STRAIN IMG SPCKL TRCK: CPT | Mod: ,,, | Performed by: INTERNAL MEDICINE

## 2024-07-23 PROCEDURE — 93306 TTE W/DOPPLER COMPLETE: CPT | Mod: 26,,, | Performed by: INTERNAL MEDICINE

## 2024-07-30 ENCOUNTER — PATIENT MESSAGE (OUTPATIENT)
Dept: FAMILY MEDICINE | Facility: CLINIC | Age: 64
End: 2024-07-30

## 2024-07-30 ENCOUNTER — E-VISIT (OUTPATIENT)
Dept: FAMILY MEDICINE | Facility: CLINIC | Age: 64
End: 2024-07-30
Payer: MEDICARE

## 2024-07-30 DIAGNOSIS — S60.569A INSECT BITE OF HAND, UNSPECIFIED LATERALITY, INITIAL ENCOUNTER: Primary | ICD-10-CM
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
## 2024-07-30 RX ORDER — TRIAMCINOLONE ACETONIDE 1 MG/G
CREAM TOPICAL 2 TIMES DAILY
Qty: 15 G | Refills: 3 | Status: SHIPPED | OUTPATIENT
Start: 2024-07-30

## 2024-07-30 RX ORDER — PREDNISONE 10 MG/1
10 TABLET ORAL 2 TIMES DAILY
Qty: 10 TABLET | Refills: 0 | Status: SHIPPED | OUTPATIENT
Start: 2024-07-30 | End: 2024-08-04

## 2024-07-30 NOTE — PROGRESS NOTES
Patient ID: Jona Levine is a 63 y.o. male.    Chief Complaint: Rash (Entered automatically based on patient selection in [employer].)    The patient initiated a request through [employer] on 7/30/2024 for evaluation and management with a chief complaint of Rash (Entered automatically based on patient selection in [employer].)     I evaluated the questionnaire submission on 7/30/24.    [facility] Evisit Rash    7/30/2024  8:50 AM CDT - Filed by Patient   Do you agree to participate in an E-Visit? Yes   If you have any of the following symptoms, please present to your local emergency room or call 911:  I acknowledge   What is the main issue you would like addressed today? Ant bites on hand (2)   How would you describe your skin problem? Lump or bump   When did your symptoms first appear? 7/29/2024   Where is it located?  Hand(s)   Does it itch? Yes   Does it hurt? No   Is there discharge or drainage? No   Is there bleeding? No   Describe the character Solid or firm   Describe the color Red   Has it changed over time? Grown in size   Frequency of skin problem Fluctuates at random   Duration of the skin problem (how long does it stay when it is present) Hours   I have had a new exposure to Insects/bugs   I have had a new exposure to Insects/bugs   What have you used to treat the skin problem? Hydrocortisone cream and tylenol   If you have used anything for treatment, has it helped the symptoms? Yes   Other generalized symptoms that you associate with the rash No other symptoms   Provide any additional information you feel is important. Hand swollen. Top of fingers are fine. Two bite marks bs beliw thumb area   At least one photo is required for treatment to be provided. You can upload a maximum of three photos of the affected area.     Are you able to take your vital signs? No         Encounter Diagnosis   Name Primary?    Insect bite of hand, unspecified laterality, initial encounter Yes        No orders of the defined types were  placed in this encounter.     Medications Ordered This Encounter   Medications    predniSONE (DELTASONE) 10 MG tablet     Sig: Take 1 tablet (10 mg total) by mouth 2 (two) times daily. for 5 days     Dispense:  10 tablet     Refill:  0    triamcinolone acetonide 0.1% (KENALOG) 0.1 % cream     Sig: Apply topically 2 (two) times daily.     Dispense:  15 g     Refill:  3        No follow-ups on file.      E-Visit Time Tracking:       Swelling/hypersensitivity reaction to hand after ant bite.  Start topical triamcinolone and prednisone x 3 days

## 2024-08-15 ENCOUNTER — TELEPHONE (OUTPATIENT)
Dept: FAMILY MEDICINE | Facility: CLINIC | Age: 64
End: 2024-08-15
Payer: MEDICARE

## 2024-08-15 DIAGNOSIS — H91.90 HEARING LOSS, UNSPECIFIED HEARING LOSS TYPE, UNSPECIFIED LATERALITY: Primary | ICD-10-CM

## 2024-08-15 NOTE — TELEPHONE ENCOUNTER
Patient is requesting a referral to audiology at [facility].

## 2024-08-15 NOTE — TELEPHONE ENCOUNTER
----- Message from Katelynn Austin sent at 8/15/2024  2:46 PM CDT -----  Contact: self  Type: Needs Medical Advice  Who Called:  pt  Symptoms (please be specific):  tinnitus  How long has patient had these symptoms:  n/a  Pharmacy name and phone #:  n/a  Best Call Back Number: [phone]   Additional Information: pt would like a referral to audiology fax [phone] [facility] in [address] [place] [address] phone [phone].please advise

## 2024-11-04 ENCOUNTER — OFFICE VISIT (OUTPATIENT)
Dept: FAMILY MEDICINE | Facility: CLINIC | Age: 64
End: 2024-11-04
Payer: MEDICARE

## 2024-11-04 ENCOUNTER — LAB VISIT (OUTPATIENT)
Dept: LAB | Facility: CLINIC | Age: 64
End: 2024-11-04
Payer: MEDICARE

## 2024-11-04 VITALS
OXYGEN SATURATION: 98 % | HEIGHT: 64 IN | HEART RATE: 81 BPM | WEIGHT: 154.88 LBS | SYSTOLIC BLOOD PRESSURE: 138 MMHG | BODY MASS INDEX: 26.44 KG/M2 | DIASTOLIC BLOOD PRESSURE: 70 MMHG

## 2024-11-04 DIAGNOSIS — E78.2 MIXED HYPERLIPIDEMIA: Primary | ICD-10-CM

## 2024-11-04 DIAGNOSIS — E78.2 MIXED HYPERLIPIDEMIA: ICD-10-CM

## 2024-11-04 DIAGNOSIS — I10 PRIMARY HYPERTENSION: ICD-10-CM

## 2024-11-04 DIAGNOSIS — R79.9 ABNORMAL FINDING OF BLOOD CHEMISTRY, UNSPECIFIED: ICD-10-CM

## 2024-11-04 DIAGNOSIS — Z87.891 HISTORY OF SMOKING GREATER THAN 50 PACK YEARS: ICD-10-CM

## 2024-11-04 DIAGNOSIS — I48.0 PAROXYSMAL ATRIAL FIBRILLATION: ICD-10-CM

## 2024-11-04 LAB
ALBUMIN SERPL BCP-MCNC: 4 G/DL (ref 3.5–5.2)
ALP SERPL-CCNC: 77 U/L (ref 40–150)
ALT SERPL W/O P-5'-P-CCNC: 35 U/L (ref 10–44)
ANION GAP SERPL CALC-SCNC: 11 MMOL/L (ref 8–16)
AST SERPL-CCNC: 26 U/L (ref 10–40)
BASOPHILS # BLD AUTO: 0.06 K/UL (ref 0–0.2)
BASOPHILS NFR BLD: 0.9 % (ref 0–1.9)
BILIRUB SERPL-MCNC: 0.5 MG/DL (ref 0.1–1)
BUN SERPL-MCNC: 15 MG/DL (ref 8–23)
CALCIUM SERPL-MCNC: 9.5 MG/DL (ref 8.7–10.5)
CHLORIDE SERPL-SCNC: 104 MMOL/L (ref 95–110)
CO2 SERPL-SCNC: 24 MMOL/L (ref 23–29)
CREAT SERPL-MCNC: 1.1 MG/DL (ref 0.5–1.4)
DIFFERENTIAL METHOD BLD: ABNORMAL
EOSINOPHIL # BLD AUTO: 0.5 K/UL (ref 0–0.5)
EOSINOPHIL NFR BLD: 6.6 % (ref 0–8)
ERYTHROCYTE [DISTWIDTH] IN BLOOD BY AUTOMATED COUNT: 13.2 % (ref 11.5–14.5)
EST. GFR  (NO RACE VARIABLE): >60 ML/MIN/1.73 M^2
ESTIMATED AVG GLUCOSE: 111 MG/DL (ref 68–131)
GLUCOSE SERPL-MCNC: 107 MG/DL (ref 70–110)
HBA1C MFR BLD: 5.5 % (ref 4–5.6)
HCT VFR BLD AUTO: 43.2 % (ref 40–54)
HGB BLD-MCNC: 14.3 G/DL (ref 14–18)
IMM GRANULOCYTES # BLD AUTO: 0.02 K/UL (ref 0–0.04)
IMM GRANULOCYTES NFR BLD AUTO: 0.3 % (ref 0–0.5)
LYMPHOCYTES # BLD AUTO: 1.4 K/UL (ref 1–4.8)
LYMPHOCYTES NFR BLD: 20.5 % (ref 18–48)
MCH RBC QN AUTO: 29.9 PG (ref 27–31)
MCHC RBC AUTO-ENTMCNC: 33.1 G/DL (ref 32–36)
MCV RBC AUTO: 90 FL (ref 82–98)
MONOCYTES # BLD AUTO: 0.6 K/UL (ref 0.3–1)
MONOCYTES NFR BLD: 9.2 % (ref 4–15)
NEUTROPHILS # BLD AUTO: 4.4 K/UL (ref 1.8–7.7)
NEUTROPHILS NFR BLD: 62.5 % (ref 38–73)
NRBC BLD-RTO: 0 /100 WBC
PLATELET # BLD AUTO: 297 K/UL (ref 150–450)
PMV BLD AUTO: 8.9 FL (ref 9.2–12.9)
POTASSIUM SERPL-SCNC: 4.2 MMOL/L (ref 3.5–5.1)
PROT SERPL-MCNC: 7.2 G/DL (ref 6–8.4)
RBC # BLD AUTO: 4.78 M/UL (ref 4.6–6.2)
SODIUM SERPL-SCNC: 139 MMOL/L (ref 136–145)
WBC # BLD AUTO: 6.98 K/UL (ref 3.9–12.7)

## 2024-11-04 PROCEDURE — G2211 COMPLEX E/M VISIT ADD ON: HCPCS | Mod: S$GLB,,, | Performed by: FAMILY MEDICINE

## 2024-11-04 PROCEDURE — 85025 COMPLETE CBC W/AUTO DIFF WBC: CPT | Performed by: FAMILY MEDICINE

## 2024-11-04 PROCEDURE — 83036 HEMOGLOBIN GLYCOSYLATED A1C: CPT | Performed by: FAMILY MEDICINE

## 2024-11-04 PROCEDURE — 80053 COMPREHEN METABOLIC PANEL: CPT | Performed by: FAMILY MEDICINE

## 2024-11-04 PROCEDURE — 99214 OFFICE O/P EST MOD 30 MIN: CPT | Mod: S$GLB,,, | Performed by: FAMILY MEDICINE

## 2024-11-04 PROCEDURE — 36415 COLL VENOUS BLD VENIPUNCTURE: CPT | Mod: ,,, | Performed by: FAMILY MEDICINE

## 2024-11-04 RX ORDER — TAMSULOSIN HYDROCHLORIDE 0.4 MG/1
0.4 CAPSULE ORAL DAILY
Qty: 30 CAPSULE | Refills: 11 | Status: SHIPPED | OUTPATIENT
Start: 2024-11-04 | End: 2025-11-04

## 2024-11-04 RX ORDER — VARENICLINE TARTRATE 0.5 (11)-1
KIT ORAL
Qty: 1 EACH | Refills: 0 | Status: SHIPPED | OUTPATIENT
Start: 2024-11-04

## 2024-11-04 NOTE — PROGRESS NOTES
"    [facility] - [place], [place]    Family Medicine Office Visit    Chief Complaint   Patient presents with    Follow-up     6 month f/u         HPI:  Routine followup:    Hyperlipidemia stable on appropriate intensity statin therapy  Blood Pressure at goal on medication, with patient reporting prescription compliance  Afib stable -s/p ablation.  On eliquis    Some BPH symptoms have returned    No longer smoking - quit earlier this spring.  Great than 40 pack years history.    Wants to pursue weight loss therapy    ROS: as above    Vitals:    11/04/24 0750   BP: 138/70   BP Location: Left arm   Patient Position: Sitting   Pulse: 81   SpO2: 98%   Weight: 70.3 kg (154 lb 14.4 oz)   Height: 5' 4" (1.626 m)      Body mass index is 26.59 kg/m².      General:  AOx3, well nourished and developed in no acute distress  Eyes:  PERRLA, EOMI, vision intact grossly  ENT:  normal hearing, moist oral mucosa  Neck:  trachea midline with no masses or thyromegaly  Heart:  RRR, no murmurs.  No edema noted, extremities warm and well perfused  Lungs:  clear to auscultation bilaterally with symmetric chest movement  Abdomen:  Soft, nontender, nondistended.  Normal bowel sounds  Musculoskeletal:  Normal gait.  Normal posture.  Normal muscular development with no joint swelling.  Neurological:  CN II-XII grossly intact. Symmetric strength and sensation  Psych:  Normal mood and affect.  Able to demonstrate good judgement and personal insight.      Assessment/Plan:    1. Mixed hyperlipidemia    2. Paroxysmal atrial fibrillation, [id number] score 2    3. Primary hypertension, dx 2000    4. History of smoking greater than 50 pack years, started age 11, 48 years, 1 ppd, quit 5/2024         Stable on statin  Stable on rhythm control and eliquis.  Will pursue weight loss  At goal  Get CT lung screen    Visit today included increased complexity associated with the care of the episodic problem htn, hld, afib addressed " and managing the longitudinal care of the patient due to the serious and/or complex managed problem(s) htn, hld, afib.

## 2024-11-04 NOTE — PATIENT INSTRUCTIONS
Doing well today  Resume medication for prostate  Get scan of lungs  Will try wegovy for weight loss    Follow up 6 months

## 2024-11-05 ENCOUNTER — PATIENT MESSAGE (OUTPATIENT)
Dept: FAMILY MEDICINE | Facility: CLINIC | Age: 64
End: 2024-11-05
Payer: MEDICARE

## 2024-11-05 NOTE — TELEPHONE ENCOUNTER
Ana Motley's Staff,  Please review this patient PA for the Wegovy was denied.  Please review.  Thank you.  Signed:  Sean Agarwal LPN

## 2024-11-06 ENCOUNTER — TELEPHONE (OUTPATIENT)
Dept: FAMILY MEDICINE | Facility: CLINIC | Age: 64
End: 2024-11-06
Payer: MEDICARE

## 2024-11-06 ENCOUNTER — HOSPITAL ENCOUNTER (OUTPATIENT)
Dept: RADIOLOGY | Facility: HOSPITAL | Age: 64
Discharge: HOME | End: 2024-11-06
Attending: FAMILY MEDICINE
Payer: MEDICARE

## 2024-11-06 DIAGNOSIS — Z87.891 HISTORY OF SMOKING GREATER THAN 50 PACK YEARS: ICD-10-CM

## 2024-11-06 PROCEDURE — 71271 CT THORAX LUNG CANCER SCR C-: CPT | Mod: TC

## 2024-11-06 NOTE — TELEPHONE ENCOUNTER
----- Message from Jolynn sent at 11/6/2024  1:04 PM CST -----  Regarding: [employer] Document  Pt came in to the [facility] on 11/06/2024 to drop off documents to be completed by provider. These documents were placed in the providers box at the  registration.

## 2024-11-19 ENCOUNTER — PATIENT MESSAGE (OUTPATIENT)
Dept: FAMILY MEDICINE | Facility: CLINIC | Age: 64
End: 2024-11-19
Payer: MEDICARE

## 2024-11-21 ENCOUNTER — PATIENT MESSAGE (OUTPATIENT)
Dept: CARDIOLOGY | Facility: CLINIC | Age: 64
End: 2024-11-21
Payer: MEDICARE

## 2024-11-21 RX ORDER — APIXABAN 5 MG/1
5 TABLET, FILM COATED ORAL 2 TIMES DAILY
Qty: 180 TABLET | Refills: 2 | Status: SHIPPED | OUTPATIENT
Start: 2024-11-21

## 2024-12-02 ENCOUNTER — PATIENT MESSAGE (OUTPATIENT)
Dept: FAMILY MEDICINE | Facility: CLINIC | Age: 64
End: 2024-12-02
Payer: MEDICARE

## 2025-06-23 DIAGNOSIS — Z00.00 ENCOUNTER FOR MEDICARE ANNUAL WELLNESS EXAM: ICD-10-CM
